# Patient Record
Sex: FEMALE | Race: OTHER | HISPANIC OR LATINO | URBAN - METROPOLITAN AREA
[De-identification: names, ages, dates, MRNs, and addresses within clinical notes are randomized per-mention and may not be internally consistent; named-entity substitution may affect disease eponyms.]

---

## 2017-01-31 ENCOUNTER — EMERGENCY (EMERGENCY)
Facility: HOSPITAL | Age: 42
LOS: 1 days | Discharge: HOME | End: 2017-01-31
Attending: EMERGENCY MEDICINE
Payer: SELF-PAY

## 2017-01-31 VITALS
TEMPERATURE: 98 F | DIASTOLIC BLOOD PRESSURE: 90 MMHG | OXYGEN SATURATION: 99 % | WEIGHT: 128.09 LBS | HEART RATE: 75 BPM | SYSTOLIC BLOOD PRESSURE: 140 MMHG | RESPIRATION RATE: 16 BRPM | HEIGHT: 59.84 IN

## 2017-01-31 DIAGNOSIS — R05 COUGH: ICD-10-CM

## 2017-01-31 DIAGNOSIS — J06.9 ACUTE UPPER RESPIRATORY INFECTION, UNSPECIFIED: ICD-10-CM

## 2017-01-31 PROCEDURE — 71020: CPT | Mod: 26

## 2017-01-31 PROCEDURE — 96372 THER/PROPH/DIAG INJ SC/IM: CPT

## 2017-01-31 PROCEDURE — 71046 X-RAY EXAM CHEST 2 VIEWS: CPT

## 2017-01-31 PROCEDURE — 93005 ELECTROCARDIOGRAM TRACING: CPT

## 2017-01-31 PROCEDURE — 99285 EMERGENCY DEPT VISIT HI MDM: CPT

## 2017-01-31 PROCEDURE — 99283 EMERGENCY DEPT VISIT LOW MDM: CPT | Mod: 25

## 2017-01-31 RX ORDER — KETOROLAC TROMETHAMINE 30 MG/ML
30 SYRINGE (ML) INJECTION ONCE
Qty: 0 | Refills: 0 | Status: DISCONTINUED | OUTPATIENT
Start: 2017-01-31 | End: 2017-01-31

## 2017-01-31 RX ORDER — IBUPROFEN 200 MG
1 TABLET ORAL
Qty: 20 | Refills: 0
Start: 2017-01-31 | End: 2017-02-05

## 2017-01-31 RX ORDER — DEXAMETHASONE 0.5 MG/5ML
10 ELIXIR ORAL ONCE
Qty: 0 | Refills: 0 | Status: COMPLETED | OUTPATIENT
Start: 2017-01-31 | End: 2017-01-31

## 2017-01-31 RX ADMIN — Medication 10 MILLIGRAM(S): at 17:13

## 2017-01-31 RX ADMIN — Medication 30 MILLIGRAM(S): at 17:13

## 2017-01-31 NOTE — ED PROVIDER NOTE - OBJECTIVE STATEMENT
41 y/o female with no PMHx presents to the ED c/o cough and loss of voice x 5 days. Pt reports 5 days of cough, throat pain, mild abd pain secondary to menstrual cramping. Pt also notes mild CP secondary to cough. Pt denies SOB, or any other complaints. NKDA.

## 2017-01-31 NOTE — ED PROVIDER NOTE - NS ED MD SCRIBE ATTENDING SCRIBE SECTIONS
HIV/HISTORY OF PRESENT ILLNESS/PAST MEDICAL/SURGICAL/SOCIAL HISTORY/PHYSICAL EXAM/DISPOSITION/VITAL SIGNS( Pullset)/REVIEW OF SYSTEMS

## 2017-01-31 NOTE — ED ADULT NURSE NOTE - OBJECTIVE STATEMENT
#: [id number]. Pt states that she has been having chest pain, nausea, cough, mentsrual cramps and abd pain fro 2 days. Denies fever and  chills. voice is hoarse and states her throat is sore. denies numbness and tingling

## 2017-01-31 NOTE — ED PROVIDER NOTE - INTERPRETATION
normal sinus rhythm, Normal axis, Normal OK interval and QRS complex. There are no acute ischemic ST or T-wave changes.

## 2017-01-31 NOTE — ED PROVIDER NOTE - MEDICAL DECISION MAKING DETAILS
Pt with cough and CP (secondary to cough). normal exam, normal EKG, CXR to r/o PNA, treat with Toradol and Decadron.

## 2018-02-07 ENCOUNTER — EMERGENCY (EMERGENCY)
Facility: HOSPITAL | Age: 43
LOS: 1 days | Discharge: HOME | End: 2018-02-07
Attending: EMERGENCY MEDICINE
Payer: SELF-PAY

## 2018-02-07 VITALS
TEMPERATURE: 99 F | RESPIRATION RATE: 16 BRPM | HEIGHT: 61 IN | HEART RATE: 88 BPM | OXYGEN SATURATION: 98 % | DIASTOLIC BLOOD PRESSURE: 79 MMHG | SYSTOLIC BLOOD PRESSURE: 115 MMHG | WEIGHT: 149.91 LBS

## 2018-02-07 PROCEDURE — 99282 EMERGENCY DEPT VISIT SF MDM: CPT

## 2018-02-07 NOTE — ED PROVIDER NOTE - OBJECTIVE STATEMENT
42 y/o F w/ no PMHx presents to the ED c/o  fever, night sweats, body aches, cough x 4 days.  States that she took ibuprofen w/ minimal relief. Pt denies any other complaints. NKDA. 42 y/o F w/ no PMHx presents to the ED c/o  fever, body aches, cough x 4 days.  States that she took ibuprofen w/ minimal relief. Pt denies any other complaints. NKDA.

## 2018-02-07 NOTE — ED PROVIDER NOTE - MEDICAL DECISION MAKING DETAILS
feels better. sx likely viral illness. daughter with exact same sx. continue supportive care at home. discussed anticipatory guidance and return precautions.

## 2020-10-07 NOTE — ED ADULT NURSE NOTE - NS ED NURSE DISCH DISPOSITION
Last visit: 10/05/2020  Last Med refill: 07/16/2020  Does patient have enough medication for 72 hours: Yes    Next Visit Date:  Future Appointments   Date Time Provider Cecelia Cochrani   10/28/2020  1:30 PM Ashlee Hilliard MD fp sc [address] Maintenance   Topic Date Due    Flu vaccine (1) 09/01/2020    A1C test (Diabetic or Prediabetic)  11/12/2020    DTaP/Tdap/Td vaccine (2 - Td) 03/19/2029    Pneumococcal 0-64 years Vaccine  Completed    HIV screen  Completed    Hepatitis A vaccine  Aged Out    Hepatitis B vaccine  Aged Out    Hib vaccine  Aged Out    Meningococcal (ACWY) vaccine  Aged Out    Varicella vaccine  Discontinued       Hemoglobin A1C (%)   Date Value   11/12/2019 5.4   10/30/2018 5.4   04/09/2015 5.4             ( goal A1C is < 7)   No results found for: LABMICR  No results found for: LDLCHOLESTEROL, LDLCALC    (goal LDL is <100)   AST (U/L)   Date Value   05/29/2019 22     ALT (U/L)   Date Value   05/29/2019 27     BUN (mg/dL)   Date Value   05/29/2019 13     BP Readings from Last 3 Encounters:   02/11/20 120/80   12/06/19 130/86   12/06/19 139/89          (goal 120/80)    All Future Testing planned in [facility]  Lab Frequency Next Occurrence   US Liver Once 12/16/2020   CBC Once 12/26/2020   Comprehensive Metabolic Panel Once 41/32/8271   Lipid Panel Once 12/26/2020   TSH without Reflex Once 12/26/2020               Patient Active Problem List:     Smoker     S/P GONZALO, RSO, appendectomy 7/7/14     Bipolar II disorder ([facility] [address]     Obesity (BMI 30.0-34. 9)     Cyst of left kidney     Insomnia     IFG (impaired fasting glucose)     Fatigue     History of total abdominal hysterectomy     Chronic bilateral low back pain without sciatica     Chronic tension-type headache, not intractable     Smokes and motivated to quit     Incomplete RBBB     GABBY (generalized anxiety disorder)     Gastroesophageal reflux disease without esophagitis     Seasonal allergic rhinitis
Discharged

## 2021-08-23 ENCOUNTER — EMERGENCY (EMERGENCY)
Facility: HOSPITAL | Age: 46
LOS: 1 days | Discharge: HOME | End: 2021-08-23
Attending: STUDENT IN AN ORGANIZED HEALTH CARE EDUCATION/TRAINING PROGRAM
Payer: SELF-PAY

## 2021-08-23 VITALS
HEIGHT: 61 IN | DIASTOLIC BLOOD PRESSURE: 80 MMHG | SYSTOLIC BLOOD PRESSURE: 118 MMHG | WEIGHT: 136.03 LBS | OXYGEN SATURATION: 98 % | TEMPERATURE: 98 F | HEART RATE: 72 BPM | RESPIRATION RATE: 20 BRPM

## 2021-08-23 PROCEDURE — 99282 EMERGENCY DEPT VISIT SF MDM: CPT

## 2021-08-23 NOTE — ED PROVIDER NOTE - OBJECTIVE STATEMENT
46 y.o female with no PMhx and no PSHx presents to the ED s/p getting a Keratin treatment in her hair x5 days ago by a hairstylist and then the next day she had burning on her scalp. Patient states she went to [facility] yesterday and they gave her fluids along with Benadryl. Patient states she woke up today with a swollen face, eyes and swelling on her scalp. Patient endorses her friend did tell her not to wash her hair for x4 days but the pain was so bad that she washed her hair with gentle shampoo and water. Patient denies any other acute complaints. NKDA

## 2021-08-23 NOTE — ED PROVIDER NOTE - NSFOLLOWUPINSTRUCTIONS_ED_ALL_ED_FT
Follow up with an allergist.     Take benadryl 25mg every 8 hours for burning or swelling.     Continue using steroid lotion to scalp only for burning.     Seek immediate medical assistance for any new or worsening symptoms such as difficulty breathing, inability to see.

## 2021-08-23 NOTE — ED PROVIDER NOTE - ATTENDING CONTRIBUTION TO CARE
Agree with residents assessment and plan. Pt well appearing, heart RRR, lungs CTAB, mild periorbital swelling likely from associated atopic dermatitis on scalp. No signs of anaphylaxis. Will dc to continue topical steroids for scalp. Strict return precautions given.

## 2021-08-23 NOTE — ED PROVIDER NOTE - SKIN, MLM
Skin normal color for race, warm, dry and intact. No evidence of rash. bilateral periorbital edema. Skin normal color for race, warm, dry and intact. No evidence of rash. Bilateral periorbital edema.

## 2021-08-23 NOTE — ED PROVIDER NOTE - NSFOLLOWUPCLINICS_GEN_ALL_ED_FT
[facility] Allergy and Immunology  Allergy  [address] [place] [address]  Phone: [phone]  Fax:   Follow Up Time: 4-6 Days

## 2021-08-23 NOTE — ED PROVIDER NOTE - CLINICAL SUMMARY MEDICAL DECISION MAKING FREE TEXT BOX
[place] - adult female presents with periorbital edema x 1 day. Last week pt had reaction to keratin hair treatment, for which she was prescribed benadryl and steroid lotion for scalp burning. vss. pe - periorbital edema. va intact. eomi. Plan: dc pt with allergy follow up and cont taking benadryl and steroid lotion.

## 2021-08-23 NOTE — ED ADULT NURSE NOTE - OBJECTIVE STATEMENT
45 y/o w/ allergic reaction from keratin since friday  swelling of eyelid and face. pt denies any shortness of breath , airway patent no signs cherelle any discomfort.

## 2021-08-23 NOTE — ED ADULT TRIAGE NOTE - CHIEF COMPLAINT QUOTE
pt is c/o facial and eyelid swelling since Friday , as per pt it started after she got" keratin tx on 8/19/21" denies any SOB , no s/s of airway obstruction noted

## 2021-08-23 NOTE — ED PROVIDER NOTE - PATIENT PORTAL LINK FT
You can access the FollowMyHealth Patient Portal offered by [facility] by registering at the following website: [email or web address]. By joining [facility]’s FollowMyHealth portal, you will also be able to view your health information using other applications (apps) compatible with our system.

## 2021-10-12 NOTE — ED PROVIDER NOTE - CHIEF COMPLAINT
PRIMARY CARE VISIT        Patient name : Sarai Pinto   MRN number: [id number]   YOB: 1944       Reason for visit:   Chief Complaint   Patient presents with   • Physical          Quality      Quality reviewed  History of Present Illness  Ms. Sarai Pinto 76 years old woman came in for for complete physicals patient at present living with her  in [facility] got her Covid vaccinations but did not there, patient states she had her mammogram and blood test done 6 months ago as per patient all reported as normal.  Last Pap smear 4 years ago through her primary MD here.  Denies any chest pain no shortness of breath no dyspnea on exertion no falls no injury.  Patient planning to get back to [place] and come back next year to get her complete physical through her primary MD   HPI  Review of Systems       Review of Systems   Constitutional: Negative for activity change, appetite change, chills, diaphoresis, fatigue, fever and unexpected weight change.   HENT: Negative for congestion, dental problem, drooling, ear discharge, ear pain, facial swelling, hearing loss, mouth sores, nosebleeds, postnasal drip, rhinorrhea, sinus pressure, sinus pain, sneezing, sore throat, tinnitus, trouble swallowing and voice change.    Eyes: Negative for photophobia, pain, discharge, redness, itching and visual disturbance.   Respiratory: Negative for apnea, cough, choking, chest tightness, shortness of breath, wheezing and stridor.    Cardiovascular: Negative for chest pain, palpitations and leg swelling.   Gastrointestinal: Negative for abdominal distention, abdominal pain, anal bleeding, blood in stool, constipation, diarrhea, nausea, rectal pain and vomiting.   Endocrine: Negative for cold intolerance, heat intolerance, polydipsia, polyphagia and polyuria.   Genitourinary: Negative for decreased urine volume, difficulty urinating, dyspareunia, dysuria, enuresis, flank pain, frequency, genital sores,  The patient is a 43y Female complaining of fever. hematuria, menstrual problem, pelvic pain, urgency, vaginal bleeding, vaginal discharge and vaginal pain.   Musculoskeletal: Negative for arthralgias, back pain, gait problem, joint swelling, myalgias, neck pain and neck stiffness.   Skin: Negative for color change, pallor, rash and wound.   Allergic/Immunologic: Negative for environmental allergies, food allergies and immunocompromised state.   Neurological: Negative for dizziness, tremors, seizures, syncope, facial asymmetry, speech difficulty, weakness, light-headedness, numbness and headaches.   Hematological: Negative for adenopathy. Does not bruise/bleed easily.   Psychiatric/Behavioral: Negative for agitation, behavioral problems, confusion, decreased concentration, dysphoric mood, hallucinations, self-injury, sleep disturbance and suicidal ideas. The patient is not nervous/anxious and is not hyperactive.         Allergies  ALLERGIES:  No Known Allergies    Current Meds  Current Outpatient Medications   Medication Sig Dispense Refill   • metoPROLOL tartrate (LOPRESSOR) 50 MG tablet Take 1 tablet by mouth 2 times daily. 180 tablet 1   • apixaBAN (ELIQUIS) 5 MG Tab Take 1 tablet by mouth every 12 hours. 180 tablet 3   • flecainide (TAMBOCOR) 50 MG tablet Take 1 tablet by mouth 2 times daily. 180 tablet 1   • Cholecalciferol (Vitamin D3) 50 mcg (2,000 units) capsule Take 1 capsule by mouth daily. 90 capsule 3   • Multiple Vitamins-Minerals (PreserVision AREDS 2) Cap Take 1 capsule by mouth 2 times daily. 180 capsule 3   • hydrochlorothiazide (HYDRODIURIL) 25 MG tablet Take 1 tablet by mouth daily. 90 tablet 0     No current facility-administered medications for this visit.     .       Past Medical History  Past Medical History:   Diagnosis Date   • A-fib (CMS/HCC) 2019   • Essential (primary) hypertension        Surgical History  Past Surgical History:   Procedure Laterality Date   • Breast surgery  2008       Family History  History reviewed. No pertinent  family history.    Social History  Social History     Tobacco Use   • Smoking status: Never Smoker   • Smokeless tobacco: Never Used   Vaping Use   • Vaping Use: never used   Substance Use Topics   • Alcohol use: Yes   • Drug use: Never               Vitals  Visit Vitals  /86 (BP Location: LUE - Left upper extremity, Patient Position: Sitting, Cuff Size: Regular)   Pulse 71   Temp 97.3 °F (36.3 °C) (Tympanic)   Resp 18   Ht 5' 3\" (1.6 m)   Wt 90.3 kg (199 lb 1.2 oz)   SpO2 98%   BMI 35.26 kg/m²              Physical Exam  Nursing note reviewed.   Constitutional:       General: She is not in acute distress.     Appearance: Normal appearance. She is well-developed. She is obese. She is not ill-appearing, toxic-appearing or diaphoretic.   HENT:      Head: Normocephalic and atraumatic.      Right Ear: Tympanic membrane, ear canal and external ear normal. There is no impacted cerumen.      Left Ear: Tympanic membrane, ear canal and external ear normal. There is no impacted cerumen.      Nose: Nose normal. No congestion or rhinorrhea.      Mouth/Throat:      Mouth: Mucous membranes are moist.      Pharynx: Oropharynx is clear. No oropharyngeal exudate or posterior oropharyngeal erythema.   Eyes:      General: No scleral icterus.        Right eye: No discharge.         Left eye: No discharge.      Extraocular Movements: Extraocular movements intact.      Conjunctiva/sclera: Conjunctivae normal.      Pupils: Pupils are equal, round, and reactive to light.   Neck:      Thyroid: No thyromegaly.      Vascular: No carotid bruit or JVD.      Trachea: No tracheal deviation.   Cardiovascular:      Rate and Rhythm: Normal rate and regular rhythm.      Pulses: Normal pulses.      Heart sounds: Normal heart sounds. No murmur heard.   No friction rub. No gallop.       Comments: Clinically in sinus rhythm today  Pulmonary:      Effort: Pulmonary effort is normal. No respiratory distress.      Breath sounds: Normal breath sounds.  No stridor. No wheezing, rhonchi or rales.   Chest:      Chest wall: No tenderness.   Abdominal:      General: Bowel sounds are normal. There is no distension.      Palpations: Abdomen is soft. There is no mass.      Tenderness: There is no abdominal tenderness. There is no guarding or rebound.      Hernia: No hernia is present.   Musculoskeletal:         General: No swelling, tenderness, deformity or signs of injury. Normal range of motion.      Cervical back: Normal range of motion and neck supple. No rigidity or tenderness.      Right lower leg: No edema.      Left lower leg: No edema.   Lymphadenopathy:      Cervical: No cervical adenopathy.   Skin:     General: Skin is dry.      Capillary Refill: Capillary refill takes less than 2 seconds.      Coloration: Skin is not jaundiced or pale.      Findings: No bruising, erythema, lesion or rash.   Neurological:      General: No focal deficit present.      Mental Status: She is alert and oriented to person, place, and time. Mental status is at baseline.      Cranial Nerves: No cranial nerve deficit.      Motor: No weakness or abnormal muscle tone.      Coordination: Coordination normal.      Gait: Gait normal.      Deep Tendon Reflexes: Reflexes are normal and symmetric.   Psychiatric:         Mood and Affect: Mood normal.         Behavior: Behavior normal.         Thought Content: Thought content normal.         Judgment: Judgment normal.          Results/Data    No visits with results within 4 Week(s) from this visit.   Latest known visit with results is:   Lab Services on 09/25/2018   Component Date Value Ref Range Status   • URIC ACID 09/25/2018 8.0* 2.6 - 5.9 mg/dl Final    The optimal level of uric acid in patients with gout or uric acid stone formation should be equal or less than 6.0 mg/dL and at core body temperature the saturation point is 6.8 mg/dL (Dorcas et al, N Engl J Med. 2005 and Shoharriett et al, Arthritis Rheum. 2004).   • COLOR 09/25/2018 YELLOW  YELLOW    Final   • APPEARANCE 09/25/2018 CLEAR    Final   • SPECIFIC GRAVITY 09/25/2018 1.014  1.005 - 1.030   Final   • pH 09/25/2018 6.0  5.0 - 7.0 Units Final   • PROTEIN(URINE) 09/25/2018 NEGATIVE  NEGATIVE mg/dl Final   • GLUCOSE(URINE) 09/25/2018 NEGATIVE  NEGATIVE mg/dl Final   • KETONES 09/25/2018 NEGATIVE  NEGATIVE mg/dl Final   • UROBILINOGEN 09/25/2018 0.2  0.0 - 1.0 mg/dl Final   • BILIRUBIN 09/25/2018 NEGATIVE  NEGATIVE   Final   • RBC-URINE 09/25/2018 NEGATIVE  NEGATIVE   Final   • NITRITE 09/25/2018 NEGATIVE  NEGATIVE   Final   • WBC-URINE 09/25/2018 NEGATIVE  NEGATIVE   Final   • Squamous EPI'S 09/25/2018 1 to 5  0 - 5 /HPF Final   • RBC 09/25/2018 1 to 3  0 - 3 /HPF Final   • WBC 09/25/2018 1 to 5  0 - 5 /HPF Final   • BACTERIA 09/25/2018 FEW* NONE SEEN /HPF Final   • Hyaline Casts 09/25/2018 1 to 5  0 - 5 /LPF Final   • SPECIMEN TYPE 09/25/2018 URINE, CLEAN CATCH/MIDSTREAM    Final   • MUCOUS 09/25/2018 PRESENT    Final   • Amorphous Material 09/25/2018 PRESENT    Final   • WHITE BLOOD COUNT 09/25/2018 9.5  4.2 - 11.0 K/mcL Final   • RED CELL COUNT 09/25/2018 5.54* 4.00 - 5.20 mil/mcL Final   • HEMOGLOBIN 09/25/2018 15.7* 12.0 - 15.5 g/dl Final   • HEMATOCRIT 09/25/2018 49.5* 36.0 - 46.5 % Final   • MEAN CORPUSCULAR VOLUME 09/25/2018 89.4  78.0 - 100.0 fL Final   • MEAN CORPUSCULAR HEMOGLOBIN 09/25/2018 28.3  26.0 - 34.0 pg Final   • MEAN CORPUSCULAR HGB CONC 09/25/2018 31.7* 32.0 - 36.5 g/dl Final   • RDW-CV 09/25/2018 12.7  11.0 - 15.0 % Final   • PLATELET COUNT 09/25/2018 253  140 - 450 K/mcL Final   • Neutrophil 09/25/2018 66  % Final   • LYMPH 09/25/2018 22  % Final   • MONO 09/25/2018 9  % Final   • EOSIN 09/25/2018 2  % Final   • BASO 09/25/2018 0  % Final   • Absolute Neutrophil 09/25/2018 6.4  1.8 - 7.7 K/mcL Final   • Absolute Lymph 09/25/2018 2.1  1.0 - 4.0 K/mcL Final   • Absolute Mono 09/25/2018 0.8  0.3 - 0.9 K/mcL Final   • Absolute Eos 09/25/2018 0.2  0.1 - 0.5 K/mcL Final   • Absolute  Baso 09/25/2018 0  0.0 - 0.3 K/mcL Final   • Sodium 09/25/2018 138  135 - 145 mmol/L Final   • Potassium 09/25/2018 4.3  3.4 - 5.1 mmol/L Final   • Chloride 09/25/2018 103  98 - 107 mmol/L Final   • Carbon Dioxide 09/25/2018 27  21 - 32 mmol/L Final   • Anion Gap 09/25/2018 12  10 - 20 mmol/L Final   • Glucose 09/25/2018 86  65 - 99 mg/dl Final   • BUN 09/25/2018 21* 6 - 20 mg/dl Final   • Creatinine 09/25/2018 1.05* 0.51 - 0.95 mg/dl Final   • GFR Estimate,  09/25/2018 61    Final    eGFR 60 - 89 mL/min/1.73m2 = Mild decrease in kidney function.   • GFR Estimate, Non  09/25/2018 53    Final    eGFR 30-59 mL/min/1.73m2 = Moderate decrease in kidney function. Stage 3 CKD (chronic kidney disease) or moderate kidney disease.   • BUN/Creatinine Ratio 09/25/2018 20  7 - 25   Final   • TOTAL BILIRUBIN 09/25/2018 0.7  0.2 - 1.0 mg/dl Final   • AST/SGOT 09/25/2018 23  <38 Units/L Final   • ALK PHOSPHATASE 09/25/2018 115  45 - 117 Units/L Final   • Albumin 09/25/2018 4.0  3.6 - 5.1 g/dl Final   • TOTAL PROTEIN 09/25/2018 7.6  6.4 - 8.2 g/dl Final   • GLOBULIN 09/25/2018 3.6  2.0 - 4.0 g/dl Final   • A/G Ratio, Serum 09/25/2018 1.1  1.0 - 2.4   Final   • CALCIUM 09/25/2018 10.0  8.4 - 10.2 mg/dl Final   • ALT/SGPT 09/25/2018 30  <79 Units/L Final   • FASTING STATUS 09/25/2018 0  hrs Final   • CHOLESTEROL 09/25/2018 190  <200 mg/dl Final    Comment:    Desirable            <200  Borderline High      200 to 239  High                 >=240        • HDL 09/25/2018 77  >49 mg/dl Final    Comment: Low            <40  Borderline Low 40 to 49  Near Optimal   50 to 59  Optimal        >=60     • TRIGLYCERIDE 09/25/2018 123  <150 mg/dl Final    Comment: Normal                   <150  Borderline High          150 to 199  High                     200 to 499  Very High                >=500     • CALCULATED LDL 09/25/2018 88  <130 mg/dl Final    Comment: OPTIMAL               <100  NEAR OPTIMAL           100-129  BORDERLINE HIGH       130-159  HIGH                  160-189  VERY HIGH             >=190     • CALCULATED NON HDL 09/25/2018 113  mg/dl Final    Comment:    Therapeutic Target:  CHD and risk equivalents <130  Multiple risk factors    <160  0 to 1 risk factors      <190        • CHOL/HDL 09/25/2018 2.5  <4.5   Final   • TSH 09/25/2018 1.95  0.350 - 5.000 mcUnits/mL Final   • DIFF TYPE 09/25/2018 AUTOMATED DIFFERENTIAL    Final   • Fasting Status 09/25/2018 0  hrs Final   • NRBC 09/25/2018 0  0 /100 WBC Final   • Percent Immature Granuloctyes 09/25/2018 1  % Final   • Absolute Immature Granulocytes 09/25/2018 0.1  0 - 0.2 K/mcL Final       Immunizations  Reviewed and recommended both influenza vaccination and Covid booster         Assessment/ PLAN  1. Essential hypertension  Clinically stable continue present oral medications low-salt diet and exercise to continue  - metoPROLOL tartrate (LOPRESSOR) 50 MG tablet; Take 1 tablet by mouth 2 times daily.  Dispense: 180 tablet; Refill: 1    2. Longstanding persistent atrial fibrillation (CMS/HCC)  Clinically in sinus today continue present medications follow with cardiology consult    3. Hypercalcemia  Advised to check the labs and follow-up with primary MD, oral fluids as tolerable    4. Age-related osteoporosis without current pathological fracture  Continue vitamin D3 supplements and calcium supplements and Fosamax and patient already already got her DEXA scan as per patient advised to forward the report to the primary MD           RETURN TO OFFICE  No follow-ups on file.        Eben Chapman MD

## 2022-01-10 ENCOUNTER — EMERGENCY (EMERGENCY)
Facility: HOSPITAL | Age: 47
LOS: 1 days | Discharge: HOME | End: 2022-01-10
Attending: EMERGENCY MEDICINE
Payer: MEDICAID

## 2022-01-10 VITALS
HEART RATE: 77 BPM | HEIGHT: 61 IN | WEIGHT: 142.42 LBS | OXYGEN SATURATION: 97 % | SYSTOLIC BLOOD PRESSURE: 132 MMHG | RESPIRATION RATE: 16 BRPM | TEMPERATURE: 99 F | DIASTOLIC BLOOD PRESSURE: 90 MMHG

## 2022-01-10 LAB — HCG UR QL: NEGATIVE — SIGNIFICANT CHANGE UP

## 2022-01-10 PROCEDURE — 99284 EMERGENCY DEPT VISIT MOD MDM: CPT

## 2022-01-10 PROCEDURE — 96374 THER/PROPH/DIAG INJ IV PUSH: CPT

## 2022-01-10 PROCEDURE — 81025 URINE PREGNANCY TEST: CPT

## 2022-01-10 PROCEDURE — 99284 EMERGENCY DEPT VISIT MOD MDM: CPT | Mod: 25

## 2022-01-10 PROCEDURE — 70450 CT HEAD/BRAIN W/O DYE: CPT | Mod: MA

## 2022-01-10 PROCEDURE — 70450 CT HEAD/BRAIN W/O DYE: CPT | Mod: 26,MA

## 2022-01-10 PROCEDURE — 96375 TX/PRO/DX INJ NEW DRUG ADDON: CPT

## 2022-01-10 PROCEDURE — 73110 X-RAY EXAM OF WRIST: CPT

## 2022-01-10 PROCEDURE — 73110 X-RAY EXAM OF WRIST: CPT | Mod: 26,RT

## 2022-01-10 RX ORDER — DIAZEPAM 5 MG
5 TABLET ORAL ONCE
Refills: 0 | Status: DISCONTINUED | OUTPATIENT
Start: 2022-01-10 | End: 2022-01-10

## 2022-01-10 RX ORDER — KETOROLAC TROMETHAMINE 30 MG/ML
15 SYRINGE (ML) INJECTION ONCE
Refills: 0 | Status: DISCONTINUED | OUTPATIENT
Start: 2022-01-10 | End: 2022-01-10

## 2022-01-10 RX ORDER — METOCLOPRAMIDE HCL 10 MG
10 TABLET ORAL ONCE
Refills: 0 | Status: COMPLETED | OUTPATIENT
Start: 2022-01-10 | End: 2022-01-10

## 2022-01-10 RX ORDER — ONDANSETRON 8 MG/1
1 TABLET, FILM COATED ORAL
Qty: 10 | Refills: 0
Start: 2022-01-10 | End: 2022-01-12

## 2022-01-10 RX ORDER — IBUPROFEN 200 MG
1 TABLET ORAL
Qty: 32 | Refills: 0
Start: 2022-01-10 | End: 2022-01-17

## 2022-01-10 RX ADMIN — Medication 5 MILLIGRAM(S): at 13:55

## 2022-01-10 RX ADMIN — Medication 15 MILLIGRAM(S): at 13:56

## 2022-01-10 RX ADMIN — Medication 10 MILLIGRAM(S): at 13:56

## 2022-01-10 NOTE — ED ADULT NURSE NOTE - NS ED NURSE RECORD ANOTHER VITAL SIGN
----- Message from Gm Horan CMA sent at 1/26/2021  4:17 PM CST -----    ----- Message -----  From: Niko Chinchilla MD  Sent: 1/23/2021   9:17 PM CST  To: Niko Chinchilla Care Team Pool    Call:  I would like patient to be seen for WCC as immunizations are due.  Please call to schedule.     Yes

## 2022-01-10 NOTE — ED PROVIDER NOTE - PHYSICAL EXAMINATION
Head is normocephalic and atraumatic. No head tenderness or skull depression on palpation. No temporal cord-like sensation, increased warmth or tenderness. Pt is alert and oriented x 3, able to follow commands. No facial asymmetry. Pupils 5 mm equally round with PERRL, no nystagmus, EOM intact. Cranial nerves III-XII grossly intact. Coordination nose-to-finger intact. All limbs equally strong bilaterally 5/5. No pronator drift. No numbness or tingling sensation.  No spinal/paraspinal tenderness. Neck is non-tender, supple with full range of motion. No nuchal rigidity.   Bilateral trapezius and rhomboid major tenderness.   No midline spinal tenderness.   Right wrist with full range of motion with tenderness to the distal radial area.   No snuffbox tenderness.   No pain with axial loading on the thumb.   All other joints with full range of motion without swelling or tenderness. Head is normocephalic and atraumatic. No head tenderness or skull depression on palpation. No temporal cord-like sensation, increased warmth or tenderness. Pt is alert and oriented x 3, able to follow commands. No facial asymmetry. Pupils 5 mm equally round with PERRL, no nystagmus, EOM intact. Cranial nerves III-XII grossly intact. Coordination nose-to-finger intact. All limbs equally strong bilaterally 5/5. No pronator drift. No spinal/paraspinal tenderness. Neck is non-tender, supple with full range of motion. No nuchal rigidity.   Bilateral trapezius and rhomboid major tenderness.   No midline spinal tenderness.   Right wrist with full range of motion with tenderness to the distal radial area.   No snuffbox tenderness.   No pain with axial loading on the thumb.   All other joints with full range of motion without swelling or tenderness.

## 2022-01-10 NOTE — ED PROVIDER NOTE - ATTENDING CONTRIBUTION TO CARE
46 yo F no pmh presents s/p mechanical slip and fall 4 days ago on ice. c/o mild frontal HA with some nausea. Denies other acute complaints.     VS wnl  NAD  NCAT  Neck supple, trachea midline  RRR  No increased WOB  Abdo soft  AAOx3, CN's II-XII intact, strength 5/5 bilateral UE and LE, sensation intact to light touch, finger to nose intact, steady gait    AP - concussion  Imaging negative  Dc supportive care

## 2022-01-10 NOTE — ED PROVIDER NOTE - OBJECTIVE STATEMENT
[place] : [phone]  47 year old female with no significant PMHx presenting to the ED for evaluation S/P a fall four days ago. Patient states that she slipped on ice and fell backwards, hitting her head at the time. Patient notes that she was wearing a hat and a kwong when her head hit the ground, and denies any loss of consciousness. Patient reports that since the injury, she has had gradual onset of a persistent bilateral frontal headache associated with nausea, sensitivity to light, generalized back pain, and mild lightheadedness when standing up from a sitting position. Patient additionally report sustaining an injury to her right wrist during the fall. Patient otherwise denies any vomiting, confusion, neck pain, numbness, tingling, focal weakness, and all other acute complaints. NKDA.

## 2022-01-10 NOTE — ED PROVIDER NOTE - CARE PLAN
1 Principal Discharge DX:	Concussion with no loss of consciousness  Secondary Diagnosis:	Sprain of wrist, right

## 2022-01-10 NOTE — ED PROVIDER NOTE - CROS ED [facility]C ALL NEG
Detail Level: Detailed Quality 431: Preventive Care And Screening: Unhealthy Alcohol Use - Screening: Patient screened for unhealthy alcohol use using a single question and scores less than 2 times per year Quality 226: Preventive Care And Screening: Tobacco Use: Screening And Cessation Intervention: Patient screened for tobacco use and is an ex/non-smoker - - -

## 2022-01-10 NOTE — ED PROVIDER NOTE - PROGRESS NOTE DETAILS
Feeling much better. CTH and XR negative for acute findings. Dx concussion and mild sprain of wrist. Plan: dc with neuro or PMD follow up within 1 week. Pt is well appearing walking with steady gait, stable for discharge and follow up without fail with medical doctor. I had a detailed discussion with the patient and/or guardian regarding the historical points, exam findings, and any diagnostic results supporting the discharge diagnosis. Pt educated on care and need for follow up. Strict return instructions and red flag signs and symptoms discussed with patient. Questions answered. Pt shows understanding of discharge information and agrees to follow.

## 2022-01-10 NOTE — ED PROVIDER NOTE - NSFOLLOWUPINSTRUCTIONS_ED_ALL_ED_FT
Follow up with the primary care or neurology doctor within 1 week.  If you experience any new or worsening symptoms or if you are concerned you can always come back to the emergency for a re-evaluation.  If there were any prescriptions given to you during the visit today take them as prescribed. If you have any questions you can ask the pharmacist. Seguimiento con el médico de atención primaria o neurología dentro de 1 semana.  Si experimenta síntomas nuevos o que empeoran, o si está preocupado, siempre puede regresar a la emergencia para domenic reevaluación.  Si le dieron alguna receta shauna la visita de nirmal brush según lo prescrito. Si tiene alguna jahaira puede preguntar al farmacéutico.    * * *    Follow up with the primary care or neurology doctor within 1 week.  If you experience any new or worsening symptoms or if you are concerned you can always come back to the emergency for a re-evaluation.  If there were any prescriptions given to you during the visit today take them as prescribed. If you have any questions you can ask the pharmacist.

## 2022-01-10 NOTE — ED PROVIDER NOTE - PATIENT PORTAL LINK FT
You can access the FollowMyHealth Patient Portal offered by [facility] by registering at the following website: [email or web address]. By joining [employer]’s FollowMyHealth portal, you will also be able to view your health information using other applications (apps) compatible with our system.

## 2022-01-10 NOTE — ED PROVIDER NOTE - NSFOLLOWUPCLINICS_GEN_ALL_ED_FT
[facility]  Neurology  [address] [place] [address]  Phone: [phone]  Fax: [phone]

## 2022-01-10 NOTE — ED PROVIDER NOTE - CLINICAL SUMMARY MEDICAL DECISION MAKING FREE TEXT BOX
47 year old female presents to the ED with complaints of concussion symptoms S/P a fall four days ago. Patient appears uncomfortable, and is in no distress. Vitals within normal limits and afebrile. Will perform CT head and X-ray of the wrist, and provide medications for symptoms.

## 2022-09-06 NOTE — ED ADULT NURSE NOTE - PAIN RATING/NUMBER SCALE (0-10): ACTIVITY
0 Taltz Counseling: I discussed with the patient the risks of ixekizumab including but not limited to immunosuppression, serious infections, worsening of inflammatory bowel disease and drug reactions.  The patient understands that monitoring is required including a PPD at baseline and must alert us or the primary physician if symptoms of infection or other concerning signs are noted.

## 2023-02-20 NOTE — ED PROVIDER NOTE - WR ORDER NAME 1
[FreeTextEntry1] : Submaximal echo stress test. Normal post exercise left ventricular function at submaximal heart rate. Normal EKG stress test.
Xray Wrist 3 Views, Right

## 2023-02-26 ENCOUNTER — EMERGENCY (EMERGENCY)
Facility: HOSPITAL | Age: 48
LOS: 1 days | Discharge: HOME | End: 2023-02-26
Attending: STUDENT IN AN ORGANIZED HEALTH CARE EDUCATION/TRAINING PROGRAM
Payer: MEDICAID

## 2023-02-26 VITALS
OXYGEN SATURATION: 98 % | WEIGHT: 143.3 LBS | TEMPERATURE: 98 F | DIASTOLIC BLOOD PRESSURE: 92 MMHG | SYSTOLIC BLOOD PRESSURE: 130 MMHG | RESPIRATION RATE: 19 BRPM | HEART RATE: 77 BPM

## 2023-02-26 LAB
ALBUMIN SERPL ELPH-MCNC: 3.7 G/DL — SIGNIFICANT CHANGE UP (ref 3.5–5)
ALP SERPL-CCNC: 83 U/L — SIGNIFICANT CHANGE UP (ref 40–120)
ALT FLD-CCNC: 54 U/L DA — SIGNIFICANT CHANGE UP (ref 10–60)
ANION GAP SERPL CALC-SCNC: 5 MMOL/L — SIGNIFICANT CHANGE UP (ref 5–17)
APPEARANCE UR: CLEAR — SIGNIFICANT CHANGE UP
AST SERPL-CCNC: 30 U/L — SIGNIFICANT CHANGE UP (ref 10–40)
BACTERIA # UR AUTO: NEGATIVE /HPF — SIGNIFICANT CHANGE UP
BASOPHILS # BLD AUTO: 0.06 K/UL — SIGNIFICANT CHANGE UP (ref 0–0.2)
BASOPHILS NFR BLD AUTO: 1.1 % — SIGNIFICANT CHANGE UP (ref 0–2)
BILIRUB SERPL-MCNC: 0.2 MG/DL — SIGNIFICANT CHANGE UP (ref 0.2–1.2)
BILIRUB UR-MCNC: NEGATIVE — SIGNIFICANT CHANGE UP
BUN SERPL-MCNC: 13 MG/DL — SIGNIFICANT CHANGE UP (ref 7–18)
CALCIUM SERPL-MCNC: 9.4 MG/DL — SIGNIFICANT CHANGE UP (ref 8.4–10.5)
CHLORIDE SERPL-SCNC: 107 MMOL/L — SIGNIFICANT CHANGE UP (ref 96–108)
CO2 SERPL-SCNC: 26 MMOL/L — SIGNIFICANT CHANGE UP (ref 22–31)
COLOR SPEC: YELLOW — SIGNIFICANT CHANGE UP
CREAT SERPL-MCNC: 0.62 MG/DL — SIGNIFICANT CHANGE UP (ref 0.5–1.3)
DIFF PNL FLD: ABNORMAL
EGFR: 110 ML/MIN/1.73M2 — SIGNIFICANT CHANGE UP
EOSINOPHIL # BLD AUTO: 0.15 K/UL — SIGNIFICANT CHANGE UP (ref 0–0.5)
EOSINOPHIL NFR BLD AUTO: 2.8 % — SIGNIFICANT CHANGE UP (ref 0–6)
EPI CELLS # UR: NEGATIVE /HPF — SIGNIFICANT CHANGE UP
GLUCOSE SERPL-MCNC: 105 MG/DL — HIGH (ref 70–99)
GLUCOSE UR QL: NEGATIVE — SIGNIFICANT CHANGE UP
HCT VFR BLD CALC: 40.1 % — SIGNIFICANT CHANGE UP (ref 34.5–45)
HGB BLD-MCNC: 13 G/DL — SIGNIFICANT CHANGE UP (ref 11.5–15.5)
IMM GRANULOCYTES NFR BLD AUTO: 0.2 % — SIGNIFICANT CHANGE UP (ref 0–0.9)
KETONES UR-MCNC: NEGATIVE — SIGNIFICANT CHANGE UP
LEUKOCYTE ESTERASE UR-ACNC: NEGATIVE — SIGNIFICANT CHANGE UP
LYMPHOCYTES # BLD AUTO: 2.2 K/UL — SIGNIFICANT CHANGE UP (ref 1–3.3)
LYMPHOCYTES # BLD AUTO: 41 % — SIGNIFICANT CHANGE UP (ref 13–44)
MCHC RBC-ENTMCNC: 28.5 PG — SIGNIFICANT CHANGE UP (ref 27–34)
MCHC RBC-ENTMCNC: 32.4 GM/DL — SIGNIFICANT CHANGE UP (ref 32–36)
MCV RBC AUTO: 87.9 FL — SIGNIFICANT CHANGE UP (ref 80–100)
MONOCYTES # BLD AUTO: 0.31 K/UL — SIGNIFICANT CHANGE UP (ref 0–0.9)
MONOCYTES NFR BLD AUTO: 5.8 % — SIGNIFICANT CHANGE UP (ref 2–14)
NEUTROPHILS # BLD AUTO: 2.63 K/UL — SIGNIFICANT CHANGE UP (ref 1.8–7.4)
NEUTROPHILS NFR BLD AUTO: 49.1 % — SIGNIFICANT CHANGE UP (ref 43–77)
NITRITE UR-MCNC: NEGATIVE — SIGNIFICANT CHANGE UP
NRBC # BLD: 0 /100 WBCS — SIGNIFICANT CHANGE UP (ref 0–0)
PH UR: 7 — SIGNIFICANT CHANGE UP (ref 5–8)
PLATELET # BLD AUTO: 326 K/UL — SIGNIFICANT CHANGE UP (ref 150–400)
POTASSIUM SERPL-MCNC: 4.3 MMOL/L — SIGNIFICANT CHANGE UP (ref 3.5–5.3)
POTASSIUM SERPL-SCNC: 4.3 MMOL/L — SIGNIFICANT CHANGE UP (ref 3.5–5.3)
PROT SERPL-MCNC: 8.1 G/DL — SIGNIFICANT CHANGE UP (ref 6–8.3)
PROT UR-MCNC: NEGATIVE — SIGNIFICANT CHANGE UP
RBC # BLD: 4.56 M/UL — SIGNIFICANT CHANGE UP (ref 3.8–5.2)
RBC # FLD: 13.5 % — SIGNIFICANT CHANGE UP (ref 10.3–14.5)
RBC CASTS # UR COMP ASSIST: SIGNIFICANT CHANGE UP /HPF (ref 0–2)
SODIUM SERPL-SCNC: 138 MMOL/L — SIGNIFICANT CHANGE UP (ref 135–145)
SP GR SPEC: 1.01 — SIGNIFICANT CHANGE UP (ref 1.01–1.02)
TROPONIN I, HIGH SENSITIVITY RESULT: 3.8 NG/L — SIGNIFICANT CHANGE UP
UROBILINOGEN FLD QL: NEGATIVE — SIGNIFICANT CHANGE UP
WBC # BLD: 5.36 K/UL — SIGNIFICANT CHANGE UP (ref 3.8–10.5)
WBC # FLD AUTO: 5.36 K/UL — SIGNIFICANT CHANGE UP (ref 3.8–10.5)
WBC UR QL: SIGNIFICANT CHANGE UP /HPF (ref 0–5)

## 2023-02-26 PROCEDURE — 96374 THER/PROPH/DIAG INJ IV PUSH: CPT

## 2023-02-26 PROCEDURE — 80053 COMPREHEN METABOLIC PANEL: CPT

## 2023-02-26 PROCEDURE — 85025 COMPLETE CBC W/AUTO DIFF WBC: CPT

## 2023-02-26 PROCEDURE — 99285 EMERGENCY DEPT VISIT HI MDM: CPT

## 2023-02-26 PROCEDURE — 71046 X-RAY EXAM CHEST 2 VIEWS: CPT

## 2023-02-26 PROCEDURE — 99285 EMERGENCY DEPT VISIT HI MDM: CPT | Mod: 25

## 2023-02-26 PROCEDURE — 87086 URINE CULTURE/COLONY COUNT: CPT

## 2023-02-26 PROCEDURE — 93010 ELECTROCARDIOGRAM REPORT: CPT

## 2023-02-26 PROCEDURE — 84484 ASSAY OF TROPONIN QUANT: CPT

## 2023-02-26 PROCEDURE — 71046 X-RAY EXAM CHEST 2 VIEWS: CPT | Mod: 26

## 2023-02-26 PROCEDURE — 36415 COLL VENOUS BLD VENIPUNCTURE: CPT

## 2023-02-26 PROCEDURE — 81001 URINALYSIS AUTO W/SCOPE: CPT

## 2023-02-26 PROCEDURE — 96375 TX/PRO/DX INJ NEW DRUG ADDON: CPT

## 2023-02-26 PROCEDURE — 93005 ELECTROCARDIOGRAM TRACING: CPT

## 2023-02-26 RX ORDER — LIDOCAINE 4 G/100G
1 CREAM TOPICAL
Qty: 7 | Refills: 0
Start: 2023-02-26 | End: 2023-03-04

## 2023-02-26 RX ORDER — ACETAMINOPHEN 500 MG
2 TABLET ORAL
Qty: 40 | Refills: 0
Start: 2023-02-26 | End: 2023-03-02

## 2023-02-26 RX ORDER — FAMOTIDINE 10 MG/ML
20 INJECTION INTRAVENOUS ONCE
Refills: 0 | Status: COMPLETED | OUTPATIENT
Start: 2023-02-26 | End: 2023-02-26

## 2023-02-26 RX ORDER — FAMOTIDINE 10 MG/ML
1 INJECTION INTRAVENOUS
Qty: 40 | Refills: 0
Start: 2023-02-26 | End: 2023-03-17

## 2023-02-26 RX ORDER — KETOROLAC TROMETHAMINE 30 MG/ML
30 SYRINGE (ML) INJECTION ONCE
Refills: 0 | Status: DISCONTINUED | OUTPATIENT
Start: 2023-02-26 | End: 2023-02-26

## 2023-02-26 RX ORDER — METHOCARBAMOL 500 MG/1
2 TABLET, FILM COATED ORAL
Qty: 18 | Refills: 0
Start: 2023-02-26 | End: 2023-02-28

## 2023-02-26 RX ADMIN — FAMOTIDINE 20 MILLIGRAM(S): 10 INJECTION INTRAVENOUS at 12:45

## 2023-02-26 RX ADMIN — Medication 30 MILLIGRAM(S): at 12:57

## 2023-02-26 RX ADMIN — Medication 30 MILLIGRAM(S): at 13:27

## 2023-02-26 NOTE — ED PROVIDER NOTE - NSFOLLOWUPCLINICS_GEN_ALL_ED_FT
[facility]  Gastroenterology  [address] [place] [address]  Phone: [phone]  Fax: [phone]

## 2023-02-26 NOTE — ED PROVIDER NOTE - PHYSICAL EXAMINATION
Back is symmetrical, scapulae are symmetric. Neck has full range of motion. No spinal tenderness, deformity or step-offs. All limbs equally strong 5+ bilaterally. Strong ankle dorsiflexion and plantar flexion against resistance bilaterally. Strong flexion/extension of big toe bilaterally. Pt is able to walk in straight line with steady gait. No recent weight loss or night sweats. No saddle anesthesia, no bowel/bladder incontinence or retention, no lower extremity weakness.   Positive left paraspinal point tenderness.

## 2023-02-26 NOTE — ED PROVIDER NOTE - CLINICAL SUMMARY MEDICAL DECISION MAKING FREE TEXT BOX
48 year old female with left mid back pain consistent with musculoskeletal pain given point tenderness. Patient also with LUQ / chest pain, likely consistent with GERD / stomach ulcer secondary to Excedrin use. Will obtain labs, urine, chest X-Ray, and EKG.

## 2023-02-26 NOTE — ED PROVIDER NOTE - PATIENT PORTAL LINK FT
You can access the FollowMyHealth Patient Portal offered by [facility] by registering at the following website: [email or web address]. By joining [employer]s FollowMyHealth portal, you will also be able to view your health information using other applications (apps) compatible with our system.

## 2023-02-26 NOTE — ED ADULT NURSE NOTE - ED CARDIAC RATE
Goal Outcome Evaluation:  Plan of Care Reviewed With: patient  Progress: improving  Outcome Summary: Patient w/o cough-no c/o SOA- no c/o Sore throat-no c/o loss of taste or smell   normal

## 2023-02-26 NOTE — ED PROVIDER NOTE - NSFOLLOWUPINSTRUCTIONS_ED_ALL_ED_FT
Villafuerte análisis de mirella, análisis de orina, electrocardiograma y radiografía de tórax fueron todos normales.    Es probable que villafuerte dolor de espalda sea un dolor muscular. [place] puede js algún tiempo para desaparecer. medicamento para el dolor (acetaminofén), parche de lidocaína y relajante muscular (Robaxin) enviado a la farmacia.    Es probable que villafuerte dolor de estómago esté relacionado con el ácido o domenic úlcera estomacal por js Excedrin. Antiácido (Pepcid) enviado a la farmacia. Debe hacer un seguimiento con un gastroenterólogo dentro de las 2 semanas.    Cesar un seguimiento con villafuerte médico de atención primaria dentro de 1 semana.    Your blood work, urine test, EKG and chest xray were all normal.     Your back pain is likely muscle pain. This can take some time to go away. pain medication (acetaminophen), Lidocaine patch, and muscle relaxer (Robaxin) sent to the pharmacy.    Your stomach pain is likely related to acid or a stomach ulcer from taking the Excedrin. Antacid (Pepcid) sent to the pharmacy. You should follow up with a gastroenterologist within 2 weeks.     Follow up with your primary care doctor within 1 week.    Dolor de espalda    El dolor de espalda puede will miedo. Joan incluso cuando el dolor es intenso, por lo general desaparece por sí solo en unas pocas semanas. Los casos que requieren atención urgente o cirugía son raros. No asumas lo peor. Chloé todo el dwight tiene dolor de espalda en algún momento.    Consulte a villafuerte médico o enfermera si tiene dolor de espalda y usted:    -Recientemente tuvo domenic caída o domenic lesión en la espalda    -Tiene entumecimiento o debilidad en las piernas    -Tiene problemas con el control de la vejiga o el intestino    -Tiene pérdida de peso inexplicable    -Tener fiebre o sentirse enfermo de otras maneras    -Js medicamentos esteroides, felix prednisona, de forma regular.    -Tiene diabetes o un problema médico que debilita villafuerte sistema inmunológico    -Tener antecedentes de cáncer u osteoporosis.    También debe consultar a un médico si:    -Villafuerte dolor de espalda es tan severo que no puede realizar tareas simples    -Villafuerte dolor de espalda no comienza a mejorar en 4 semanas    Muchas cosas diferentes pueden causar dolor lumbar. La mayoría de las veces, los médicos no conocen la causa exacta.    El dolor de espalda puede ocurrir si se esfuerza un músculo. [place] es a menudo lo que adrian sucedido cuando domenic persona "se bianka" la espalda. [place] se refiere al dolor que comienza repentinamente después de la actividad física, felix levantar algo pesado o doblar la espalda.    El dolor de espalda también puede ocurrir si tiene:    -Discos dañados, abultados o rotos    -Artritis que afecta las articulaciones de la columna vertebral    - Crecimientos óseos en las vértebras que aprietan los nervios cercanos    -Domenic vértebra fuera de lugar    -Estrechamiento en el canal mandel    -Un tumor o infección (joan esto es muy raro)    La mayoría de las personas con un episodio de dolor lumbar no tienen un problema médico grave y pueden probar tratamientos simples felix:    -Mantenerse activo: lo mejor que puede hacer es mantenerse lo más activo posible. Las personas con dolor lumbar se recuperan más rápido si se mantienen activas. Si villafuerte dolor es intenso, es posible que deba descansar shauna ho o dos días. Joan es importante volver a caminar y moverse lo antes posible. Si herbie debe evitar el levantamiento de objetos pesados ??y los deportes mientras le duele la espalda, intente continuar con david actividades diarias normales.    -Calor: a algunas personas les resulta útil usar domenic almohadilla térmica o domenic envoltura térmica. Tenga cuidado de evitar los ajustes de calor alto para evitar quemaduras en la piel.    -Medicamentos: kt, puede probar analgésicos que puede obtener sin receta médica. En muchos casos, los médicos sugieren probar kt un medicamento antiinflamatorio no esteroideo o "AKIRA". Los AKIRA incluyen ibuprofeno (marcas de muestra: Advil, Motrin) y naproxeno (marcas de muestra: Aleve). Estos podrían funcionar mejor que el paracetamol (Tylenol) para el dolor de espalda.    -Tratamientos para ayudar con los síntomas: algunos tratamientos pueden ayudarlo a sentirse mejor por un tiempo. Incluyen:    -Manipulación de la columna: esto es cuando un quiropráctico, fisioterapeuta u otro profesional mueve o "ajusta" las articulaciones de la espalda. Si quiere probar esto, hable kt con villafuerte médico o enfermera.    -Acupuntura: esto es cuando alguien que conoce la medicina tradicional china inserta pequeñas agujas en villafuerte cuerpo para bloquear las señales de dolor.    -Masaje    Si herbie el dolor de espalda generalmente desaparece en unas pocas semanas, algunas personas continúan teniendo dolor por más tiempo. En kayla patrice, los tratamientos adicionales pueden incluir:    -Cuidado propio: esto implica ser consciente de villafuerte dolor. Si herbie debe descansar cuando lo necesite, es importante mantenerse activo tanto felix pueda. Cosas felix aplicar calor y hacer estiramientos suaves también pueden ayudarlo a sentirse mejor.    -Fisioterapia: un fisioterapeuta es un experto en ejercicios que puede enseñarle estiramientos y movimientos para ayudarlo a fortalecer david músculos. El objetivo es aliviar el dolor joan también ayudarlo a volver a david actividades normales. Los ejercicios que puede probar incluyen caminar, nadar o usar domenic bicicleta estática. Algunas personas también encuentran que el [place] o el yoga pueden ayudar con el dolor de espalda. Encontrar actividades que disfrute puede ayudarlo a mantenerse activo.    -Reducir el estrés: a algunas personas les resulta útil probar algo llamado "reducción del estrés basada en la atención plena". [place] implica asistir a un programa grupal para practicar la relajación y la meditación. Si villafuerte dolor de espalda lo hace sentir ansioso o deprimido, hable con villafuerte médico o enfermera. Existen otros tratamientos que pueden ayudar con estos problemas.

## 2023-02-26 NOTE — ED PROVIDER NOTE - OBJECTIVE STATEMENT
48 year old female with no pertinent medical history presents to ED complaining of left mid back pain for 15 days. She reports that the pain travels up her back, and for the last week has started to travel under the left rib. Patient states that the pain under her ribs gets worse when she eats. Endorses taking Excedrin for back pain but stopped because she thinks it is causing abdominal pain. Denies nausea, vomiting, diarrhea, pain with urination, or hematuria. Reports chest pain as well. NKDA.

## 2023-02-26 NOTE — ED PROVIDER NOTE - PROGRESS NOTE DETAILS
labs WNL. CXR normal. Will dc home with PCP follow up. Pt is well appearing walking with steady gait, stable for discharge and follow up without fail with medical doctor. I had a detailed discussion with the patient and/or guardian regarding the historical points, exam findings, and any diagnostic results supporting the discharge diagnosis. Pt educated on care and need for follow up. Strict return instructions and red flag signs and symptoms discussed with patient. Questions answered. Pt shows understanding of discharge information and agrees to follow.

## 2023-02-27 LAB
CULTURE RESULTS: SIGNIFICANT CHANGE UP
SPECIMEN SOURCE: SIGNIFICANT CHANGE UP

## 2023-11-06 NOTE — ED ADULT NURSE NOTE - ADDITIONAL PRINTED INSTRUCTIONS GIVEN
Physical Therapy Visit    Visit Type: Daily Treatment Note  Visit: 6  Referring Provider: Annelise Mcgrath MD  Medical Diagnosis (from order): M54.50, G89.29 - Chronic low back pain, unspecified back pain laterality, unspecified whether sciatica present     SUBJECTIVE                                                                                                               Patient states that she felt very good after the last visit. Did a lot of household work over the weekend and felt a dull ache in her lower right side of her back yesterday and this morning. Feels that therapy has been helping relieve her pain.   Functional Change:      Pain / Symptoms  - Pain rating (out of 10): Current: 1       OBJECTIVE                                                                                                                                       Treatment     Therapeutic Exercise  Elliptical x 5 minutes   Seated hamstring stretch with foot on the floor 2 x 20 seconds holds  Squats with tap to chair abdominis contraction 2 x 10   Seated hip hinges with use of foam roll 2 x 10   Forward \"T\"/tipping bird with one arm support to encourage proper lifting mechanics 2 x 10 bilateral   Single leg stance hip alphabet on air ex 2x bilateral   Standing hip abduction with transverse abdominis contraction 2 x 10 bilateral, 2nd round with Yellow theraband   Standing hip extension with transverse abdominis contraction 2 x 10 bilateral, 2nd round with Yellow theraband   Pallof press with red theraband 2 x 10 each direction  Lateral walk outs with red theraband 2 x 10 each direction     Dual leg press seat 9, incline 2, 125 pounds, 2 x 10 reps   Single leg press seat 9, incline 2, 75 pounds, 2 x 10 bilateral   Seated lumbar flexion stretch with green physio ball forward, left, right x 10 each direction      Skilled input: verbal instruction/cues, tactile instruction/cues and demonstration    Writer verbally educated and received verbal  Pt seen, treated and released in intake. See stat docs. consent for hand placement, positioning of patient, and techniques to be performed today from patient for clothing adjustments for techniques, therapist position for techniques and hand placement and palpation for techniques as described above and how they are pertinent to the patient's plan of care.  Home Exercise Program  Access Code: [email or web address]  URL: [email or web address]  Date: 11/01/2023  Prepared by: Margaux Prasad    Exercises  - Supine Posterior Pelvic Tilt  - 1 x daily - 5 x weekly - 2 sets - 10 reps - 3 hold  - Supine Bridge  - 1 x daily - 5 x weekly - 2 sets - 10 reps  - Clamshell  - 1 x daily - 5 x weekly - 2 sets - 10 reps  - Seated Hamstring Stretch  - 1-2 x daily - 1 sets - 3 reps - 20 seconds hold  - Supine Piriformis Stretch with Foot on Ground  - 1-2 x daily - 1 sets - 3 reps - 20 seconds hold  - Straight Leg Raise  - 1 x daily - 5 x weekly - 2 sets - 10 reps  - Squat with Chair Touch  - 1 x daily - 5 x weekly - 2 sets - 10 reps  - Standing Hip Abduction with Counter Support  - 1 x daily - 5 x weekly - 2 sets - 10 reps      ASSESSMENT                                                                                                            Patient continues to make progress with reduced lower back pain with strengthening exercises. Focus on standing strengthening this date, patient without increased pain but does report muscle fatigue. Able to increase resistance on leg press and add light resistance to hip strengthening, issued to home exercise program.   Pain/symptoms after session (out of 10): 0 and 1  Education:   - Results of above outlined education: Verbalizes understanding and Demonstrates understanding    PLAN                                                                                                                           Suggestions for next session as indicated: Progress per plan of care  Core stabilization and proximal strengthening        Patient  progress, plan of care and goals discussed face to face between providing therapist and assistant.        Therapy procedure time and total treatment time can be found documented on the Time Entry flowsheet

## 2025-03-18 NOTE — ED ADULT TRIAGE NOTE - NS ED NURSE DIRECT TO ROOM YN
Physical Therapy Visit    Visit Type: Daily Treatment Note  Visit: 7  Referring Provider: Kasey Lynn Bantes-Behmk*  Medical Diagnosis (from order): S32.9XXA - Closed displaced fracture of pelvis, unspecified part of pelvis, initial encounter  (CMD)     SUBJECTIVE                                                                                                               Patient running late to appointment today.   She is ambulating without a cane and feeling a lot better.   She has some mild soreness, but not taking any medication.     Functional Change: Pleased with progress. Returns to work on Monday.   Feels stable and confident on the stairs.     Pain / Symptoms  - Pain rating (out of 10): Current: 0       OBJECTIVE                                                                                                                                       Treatment     Therapeutic Exercise  Recumbent bike x 3 minutes, level 4 - for lower extremity range of motion    Side stepping 4 x 15 feet   -with level 1 theraband at ankles 4 x 15 feet  Walking marching 4 x 15 feet  Monster walk 4 x 15 feet  -with level 1 theraband at ankles 4 x 15 feet  Mini squat x 10  -with 8 pound dumbbell hold x 10  Single leg hip hinge x 10, bilateral - tap to bar   8 inch forward step up x 15, bilateral - 8 pound dumbbells  Lunges x 10, bilateral - left knee pain, partial range for better pain control     Skilled input: verbal instruction/cues and tactile instruction/cues  instruction/cues for: home exercise program    Writer verbally educated and received verbal consent for hand placement, positioning of patient, and techniques to be performed today from patient for therapist position for techniques and hand placement and palpation for techniques as described above and how they are pertinent to the patient's plan of care.  Home Exercise Program  Access Code: [email or web address]  URL: [email or web address]  Date:  03/06/2025  Prepared by: Kendra    Exercises  - Prone Hip Extension  - 3 x daily - 7 x weekly - 1 sets - 10 reps  - Single Leg Bridge  - 7 x weekly - 2 sets - 10 reps  - Obliques  - 7 x weekly - 2 sets - 10 reps  - Squat with Dumbbells  - 7 x weekly - 2 sets - 10 reps  - Side Stepping with Resistance at Ankles  - 7 x weekly - 2 sets - 10 reps  - Step Up  - 7 x weekly - 2 sets - 10 reps  - Band Walks  - 7 x weekly - 2 sets - 10 reps  - Standard Lunge  - 7 x weekly - 2 sets - 10 reps  - Forward T  - 7 x weekly - 1 sets - 5-10 reps      ASSESSMENT                                                                                                            Salima ***    Patient will continue to benefit from skilled physical therapy to address objective deficits and return to safe daily and functional activities.   Education:   - Results of above outlined education: Verbalizes understanding and Needs reinforcement    PLAN                                                                                                                           Suggestions for next session as indicated: Progress per plan of care, progress lower extremity strengthening, functional strengthening, gait training       Therapy procedure time and total treatment time can be found documented on the Time Entry flowsheet     Yes
